# Patient Record
Sex: MALE | Race: WHITE | Employment: FULL TIME | URBAN - METROPOLITAN AREA
[De-identification: names, ages, dates, MRNs, and addresses within clinical notes are randomized per-mention and may not be internally consistent; named-entity substitution may affect disease eponyms.]

---

## 2017-08-28 ENCOUNTER — HOSPITAL ENCOUNTER (EMERGENCY)
Facility: CLINIC | Age: 32
Discharge: HOME | End: 2017-08-29
Attending: EMERGENCY MEDICINE | Admitting: EMERGENCY MEDICINE
Payer: COMMERCIAL

## 2017-08-28 ENCOUNTER — APPOINTMENT (OUTPATIENT)
Dept: GENERAL RADIOLOGY | Facility: CLINIC | Age: 32
End: 2017-08-28
Attending: EMERGENCY MEDICINE
Payer: COMMERCIAL

## 2017-08-28 DIAGNOSIS — R06.02 SHORTNESS OF BREATH: ICD-10-CM

## 2017-08-28 DIAGNOSIS — R07.89 CHEST TIGHTNESS: ICD-10-CM

## 2017-08-28 LAB
ANION GAP SERPL CALCULATED.3IONS-SCNC: 8 MMOL/L (ref 3–14)
BASOPHILS # BLD AUTO: 0 10E9/L (ref 0–0.2)
BASOPHILS NFR BLD AUTO: 0.2 %
BUN SERPL-MCNC: 16 MG/DL (ref 7–30)
CALCIUM SERPL-MCNC: 8.2 MG/DL (ref 8.5–10.1)
CHLORIDE SERPL-SCNC: 106 MMOL/L (ref 94–109)
CO2 SERPL-SCNC: 27 MMOL/L (ref 20–32)
CREAT SERPL-MCNC: 1.29 MG/DL (ref 0.66–1.25)
D DIMER PPP FEU-MCNC: <0.3 UG/ML FEU (ref 0–0.5)
DIFFERENTIAL METHOD BLD: NORMAL
EOSINOPHIL # BLD AUTO: 0.3 10E9/L (ref 0–0.7)
EOSINOPHIL NFR BLD AUTO: 4.3 %
ERYTHROCYTE [DISTWIDTH] IN BLOOD BY AUTOMATED COUNT: 12.8 % (ref 10–15)
GFR SERPL CREATININE-BSD FRML MDRD: 64 ML/MIN/1.7M2
GLUCOSE SERPL-MCNC: 125 MG/DL (ref 70–99)
HCT VFR BLD AUTO: 41.2 % (ref 40–53)
HGB BLD-MCNC: 14.4 G/DL (ref 13.3–17.7)
IMM GRANULOCYTES # BLD: 0 10E9/L (ref 0–0.4)
IMM GRANULOCYTES NFR BLD: 0.2 %
LYMPHOCYTES # BLD AUTO: 2.2 10E9/L (ref 0.8–5.3)
LYMPHOCYTES NFR BLD AUTO: 37.4 %
MCH RBC QN AUTO: 31 PG (ref 26.5–33)
MCHC RBC AUTO-ENTMCNC: 35 G/DL (ref 31.5–36.5)
MCV RBC AUTO: 89 FL (ref 78–100)
MONOCYTES # BLD AUTO: 0.3 10E9/L (ref 0–1.3)
MONOCYTES NFR BLD AUTO: 5.2 %
NEUTROPHILS # BLD AUTO: 3.2 10E9/L (ref 1.6–8.3)
NEUTROPHILS NFR BLD AUTO: 52.7 %
NRBC # BLD AUTO: 0 10*3/UL
NRBC BLD AUTO-RTO: 0 /100
PLATELET # BLD AUTO: 182 10E9/L (ref 150–450)
POTASSIUM SERPL-SCNC: 3.4 MMOL/L (ref 3.4–5.3)
RBC # BLD AUTO: 4.65 10E12/L (ref 4.4–5.9)
SODIUM SERPL-SCNC: 142 MMOL/L (ref 133–144)
TROPONIN I SERPL-MCNC: <0.015 UG/L (ref 0–0.04)
WBC # BLD AUTO: 6 10E9/L (ref 4–11)

## 2017-08-28 PROCEDURE — 71020 XR CHEST 2 VW: CPT

## 2017-08-28 PROCEDURE — 25000125 ZZHC RX 250: Performed by: EMERGENCY MEDICINE

## 2017-08-28 PROCEDURE — 84484 ASSAY OF TROPONIN QUANT: CPT | Performed by: EMERGENCY MEDICINE

## 2017-08-28 PROCEDURE — 85025 COMPLETE CBC W/AUTO DIFF WBC: CPT | Performed by: EMERGENCY MEDICINE

## 2017-08-28 PROCEDURE — 93005 ELECTROCARDIOGRAM TRACING: CPT | Performed by: EMERGENCY MEDICINE

## 2017-08-28 PROCEDURE — 99284 EMERGENCY DEPT VISIT MOD MDM: CPT | Mod: 25 | Performed by: EMERGENCY MEDICINE

## 2017-08-28 PROCEDURE — 80076 HEPATIC FUNCTION PANEL: CPT | Performed by: EMERGENCY MEDICINE

## 2017-08-28 PROCEDURE — 94640 AIRWAY INHALATION TREATMENT: CPT | Performed by: EMERGENCY MEDICINE

## 2017-08-28 PROCEDURE — 93010 ELECTROCARDIOGRAM REPORT: CPT | Mod: Z6 | Performed by: EMERGENCY MEDICINE

## 2017-08-28 PROCEDURE — 85379 FIBRIN DEGRADATION QUANT: CPT | Performed by: EMERGENCY MEDICINE

## 2017-08-28 PROCEDURE — 99285 EMERGENCY DEPT VISIT HI MDM: CPT | Mod: 25 | Performed by: EMERGENCY MEDICINE

## 2017-08-28 PROCEDURE — 80048 BASIC METABOLIC PNL TOTAL CA: CPT | Performed by: EMERGENCY MEDICINE

## 2017-08-28 RX ORDER — IPRATROPIUM BROMIDE AND ALBUTEROL SULFATE 2.5; .5 MG/3ML; MG/3ML
3 SOLUTION RESPIRATORY (INHALATION) ONCE
Status: COMPLETED | OUTPATIENT
Start: 2017-08-28 | End: 2017-08-28

## 2017-08-28 RX ORDER — POTASSIUM CHLORIDE 1.5 G/1.58G
20 POWDER, FOR SOLUTION ORAL ONCE
Status: DISCONTINUED | OUTPATIENT
Start: 2017-08-28 | End: 2017-08-28

## 2017-08-28 RX ADMIN — IPRATROPIUM BROMIDE AND ALBUTEROL SULFATE 3 ML: .5; 3 SOLUTION RESPIRATORY (INHALATION) at 21:59

## 2017-08-28 ASSESSMENT — ENCOUNTER SYMPTOMS
DYSURIA: 0
DIZZINESS: 1
NUMBNESS: 0
NAUSEA: 1
CHILLS: 0
BACK PAIN: 0
COLOR CHANGE: 0
BRUISES/BLEEDS EASILY: 0
LIGHT-HEADEDNESS: 0
DIARRHEA: 0
NECK PAIN: 0
NERVOUS/ANXIOUS: 0
TROUBLE SWALLOWING: 0
EYE PAIN: 0
HEADACHES: 0
VOMITING: 0
BLOOD IN STOOL: 0
DYSPHORIC MOOD: 0
PALPITATIONS: 0
DIAPHORESIS: 0
FREQUENCY: 0
HEMATURIA: 0
VOICE CHANGE: 0
CONSTIPATION: 0
POLYDIPSIA: 0
ADENOPATHY: 0
SORE THROAT: 0
SHORTNESS OF BREATH: 1
CHEST TIGHTNESS: 1
WEAKNESS: 0
COUGH: 1
FEVER: 0
ABDOMINAL PAIN: 0

## 2017-08-28 NOTE — ED AVS SNAPSHOT
[facility], Emergency Department    [address] [facility] [address]    Phone:  [phone]                                       Arnold Castle   MRN: [id number]    Department:  [facility], Emergency Department   Date of Visit:  8/28/2017           Patient Information     Date Of Birth          1985        Your diagnoses for this visit were:     Chest tightness        You were seen by Susie Enamorado MD.        Discharge Instructions       TODAY'S VISIT:  You were seen today for shortness of breath and chest tightness.   - Your testing today so far was generally within normal limits, with the exception of your kidney function that was slightly elevated.   - Because we do not yet know the cause of her symptoms    FOLLOW-UP:  Please make an appointment to follow up with:  - Cardiology Clinic (phone: [phone])   -  Your Primary Care Provider, [facility] (phone: [phone]) or [facility] (phone: [phone])     PRESCRIPTIONS / MEDICATIONS:  - Try to avoid ibuprofen/NSAID medications here in the short term given your kidney test results today. You can discuss this with the outpatient Cardiology or your Primary Care teams.     OTHER INSTRUCTIONS:  - Do your best to stay hydrated.     RETURN TO THE EMERGENCY DEPARTMENT  Return to the Emergency Department at any time for new/worsening symptoms.        *CHEST PAIN, UNCERTAIN CAUSE    Based on your exam today, the exact cause of your chest pain is not certain. Your condition does not seem serious at this time, and your pain does not appear to be coming from your heart. However, sometimes the signs of a serious problem take more time to appear. Therefore, watch for the warning signs listed below.  HOME CARE:  1. Rest today and avoid strenuous activity.  2. Take any prescribed medicine as directed.  FOLLOW UP with your doctor in 1-3 days.   GET PROMPT MEDICAL ATTENTION if any of the following occur:    A change in  the type of pain: if it feels different, becomes more severe, lasts longer, or begins to spread into your shoulder, arm, neck, jaw or back    Shortness of breath or increased pain with breathing    Weakness, dizziness, or fainting    Cough with blood or dark colored sputum (phlegm)    Fever over 101  F (38.3  C)    Swelling, pain or redness in one leg    [id number] Nikky Smith, [address] [place] [address], [place] [address]. All rights reserved. This information is not intended as a substitute for professional medical care. Always follow your healthcare professional's instructions.      Shortness of Breath (Dyspnea)  Shortness of breath is the feeling that you can't catch your breath or get enough air. It is also known as dyspnea.  Dyspnea can be caused by many different conditions. They include:    Acute asthma attack.    Worsening of chronic lung diseases such as chronic bronchitis and emphysema.    Heart failure. This is when weak heart muscle allows extra fluid to collect in the lungs.    Panic attacks or anxiety. Fear can cause rapid breathing (hyperventilation).    Pneumonia, or an infection in the lung tissue.    Exposure to toxic substances, fumes, smoke, or certain medicines.    Blood clot in the lung (pulmonary embolism). This is often from a piece of blood clot in a deep vein of the leg (deep vein thrombosis) that breaks off and travels to the lungs.    Heart attack or heart-related chest pain (angina).    Anemia.    Collapsed lung (pneumothorax).    Dehydration.    Pregnancy.  Based on your visit today, the exact cause of your shortness of breath is not certain. Your tests don t show any of the serious causes of dyspnea. You may need other tests to find out if you have a serious problem. It s important to watch for any new symptoms or symptoms that get worse. Follow up with your healthcare provider as directed.  Home care  Follow these tips to take care of yourself at home:    When your symptoms are  better, go back to your usual activities.    If you smoke, you should stop. Join a quit-smoking program or ask your healthcare provider for help.    Eat a healthy diet and get plenty of sleep.    Get regular exercise. Talk with your healthcare provider before starting to exercise, especially if you have other medical problems.    Cut down on the amount of caffeine and stimulants you consume.  Follow-up care  Follow up with your healthcare provider, or as advised.  If tests were done, you will be told if your treatment needs to be changed. You can call as directed for the results.  (Note: If an X-ray was taken, a specialist will review it. You will be notified of any new findings that may affect your care.)  Call 911 or get immediate medical care  Shortness of breath may be a sign of a serious medical problem. For example, it may be a problem with your heart or lungs. Call 911 if you have worsening shortness of breath or trouble breathing, especially with any of the symptoms below:    You are confused or it s difficult to wake you.    You faint or lose consciousness.    You have a fast heartbeat, or your heartbeat is irregular.    You are coughing up blood.    You have pain in your chest, arm, shoulder, neck, or upper back.    You break out in a sweat.  When to seek medical advice  Call your healthcare provider right away if any of these occur:    Slight shortness of breath or wheezing    Redness, pain or swelling in your leg, arm, or other body area    Swelling in both legs or ankles    Fast weight gain    Dizziness or weakness    Fever of 100.4 F (38 C) or higher, or as directed by your healthcare provider  Date Last Reviewed: 9/13/2015 2000-2017 [employer]. [address] [place] [address]. All rights reserved. This information is not intended as a substitute for professional medical care. Always follow your healthcare professional's instructions.          24 Hour Appointment Hotline        To make an appointment at any [facility], call [phone] ([phone]). If you don't have a family doctor or clinic, we will help you find one. [facility] are conveniently located to serve the needs of you and your family.          ED Discharge Orders     CARDIOLOGY EVAL ADULT REFERRAL       Your provider has referred you to:  [facility]: [facility] [phone]   [email or web address]    Please be aware that coverage of these services is subject to the terms and limitations of your health insurance plan.  Call member services at your health plan with any benefit or coverage questions.      Type of Referral:  New Cardiology Consult    Timeframe requested:  1-3 Days    Please bring the following to your appointment:  >>   Any x-rays, CTs or MRIs which have been performed.  Contact the facility where they were done to arrange for  prior to your scheduled appointment.    >>   List of current medications  >>   This referral request   >>   Any documents/labs given to you for this referral                     Review of your medicines      Our records show that you are taking the medicines listed below. If these are incorrect, please call your family doctor or clinic.        Dose / Directions Last dose taken    HYDROcodone-acetaminophen 5-325 MG per tablet   Commonly known as:  NORCO   Dose:  1-2 tablet   Quantity:  30 tablet        Take 1-2 tablets by mouth every 6 hours as needed for pain.   Refills:  0        NO ACTIVE MEDICATIONS        Refills:  0                Procedures and tests performed during your visit     Basic metabolic panel    CBC with platelets differential    D dimer quantitative    EKG 12-lead, tracing only    Hepatic panel    Magnesium    Phosphorus    Troponin I    XR Chest 2 Views      Orders Needing Specimen Collection     None      Pending Results     Date and Time Order  "Name Status Description    2017 2249 XR Chest 2 Views Preliminary     2017 Phosphorus In process     2017 Magnesium In process     2017 EKG 12-lead, tracing only Preliminary             Pending Culture Results     No orders found for last 3 day(s).            Pending Results Instructions     If you had any lab results that were not finalized at the time of your Discharge, you can call the ED Lab Result RN at [phone]. You will be contacted by this team for any positive Lab results or changes in treatment. The nurses are available 7 days a week from 10A to 6:30P.  You can leave a message 24 hours per day and they will return your call.        Thank you for choosing [facility]       Thank you for choosing [facility] for your care. Our goal is always to provide you with excellent care. Hearing back from our patients is one way we can continue to improve our services. Please take a few minutes to complete the written survey that you may receive in the mail after you visit with us. Thank you!        [employer] Information     [employer] lets you send messages to your doctor, view your test results, renew your prescriptions, schedule appointments and more. To sign up, go to [email or web address] . Click on \"Log in\" on the left side of the screen, which will take you to the Welcome page. Then click on \"Sign up Now\" on the right side of the page.     You will be asked to enter the access code listed below, as well as some personal information. Please follow the directions to create your username and password.     Your access code is: [email or web address]  Expires: 2017  1:08 AM     Your access code will  in 90 days. If you need help or a new code, please call your [facility] clinic or [phone].        Care EveryWhere ID     This is your Care EveryWhere ID. This could be used by other organizations to access your [facility] medical records  [id number]        Equal Access to Services     " ALIZA GOMEZ : Hadii ivana Ackerman, wamariamda luqadaha, qaybta kaalmatyrell frankel, ana maria gallegos. So [facility] [phone].    ATENCIÓN: Si habla español, tiene a castañeda disposición servicios gratuitos de asistencia lingüística. Llame al [phone].    We comply with applicable federal civil rights laws and [place] laws. We do not discriminate on the basis of race, color, national origin, age, disability sex, sexual orientation or gender identity.            After Visit Summary       This is your record. Keep this with you and show to your community pharmacist(s) and doctor(s) at your next visit.

## 2017-08-28 NOTE — ED AVS SNAPSHOT
[facility], [place], Emergency Department    [address] [place] [address]    Phone:  [phone]                                       Arnold Castle   MRN: [id number]    Department:  [facility], Emergency Department   Date of Visit:  8/28/2017           After Visit Summary Signature Page     I have received my discharge instructions, and my questions have been answered. I have discussed any challenges I see with this plan with the nurse or doctor.    ..........................................................................................................................................  Patient/Patient Representative Signature      ..........................................................................................................................................  Patient Representative Print Name and Relationship to Patient    ..................................................               ................................................  Date                                            Time    ..........................................................................................................................................  Reviewed by Signature/Title    ...................................................              ..............................................  Date                                                            Time

## 2017-08-29 VITALS
OXYGEN SATURATION: 97 % | TEMPERATURE: 97.6 F | HEIGHT: 71 IN | SYSTOLIC BLOOD PRESSURE: 117 MMHG | RESPIRATION RATE: 19 BRPM | DIASTOLIC BLOOD PRESSURE: 81 MMHG | HEART RATE: 67 BPM

## 2017-08-29 LAB
ALBUMIN SERPL-MCNC: 3.9 G/DL (ref 3.4–5)
ALP SERPL-CCNC: 46 U/L (ref 40–150)
ALT SERPL W P-5'-P-CCNC: 36 U/L (ref 0–70)
AST SERPL W P-5'-P-CCNC: 21 U/L (ref 0–45)
BILIRUB DIRECT SERPL-MCNC: 0.3 MG/DL (ref 0–0.2)
BILIRUB SERPL-MCNC: 1.6 MG/DL (ref 0.2–1.3)
INTERPRETATION ECG - MUSE: NORMAL
PROT SERPL-MCNC: 6.7 G/DL (ref 6.8–8.8)

## 2017-08-29 RX ORDER — ALBUTEROL SULFATE 90 UG/1
2 AEROSOL, METERED RESPIRATORY (INHALATION) EVERY 6 HOURS PRN
Qty: 1 INHALER | Refills: 0 | Status: SHIPPED | OUTPATIENT
Start: 2017-08-29

## 2017-08-29 NOTE — ED PROVIDER NOTES
History     Chief Complaint   Patient presents with     Shortness of Breath     Pleurisy     Dizziness     HPI  Arnold Castle is a 32 year old male who presents for evaluation of chest tightness and shortness of breath. Patient complains of intermittent episodes of shortness of breath and chest tightness with tenderness to palpation. He also complains of dizziness described as lightheadedness as though he may pass out. Additionally, he noted some nausea as well as a mild cough and cold sweats associated with these episodes. He denies nasal congestion, abdominal pain, or vomiting. He notes he did recently return from his honeymoon in [place], and notes his symptoms seemed to start the morning after he arrived in [place]. He reports he has had two episodes with similar symptoms prior to his current symptoms. Each episode was intermittent over about one week, though acutely more severe and constant for about one day. Both of these episodes have occurred since March of this year. No changes in bowel or bladder movements.No rash or skin changes. No known recent tick bites.     Patient does work as a  and is around fumes at work, though no chemicals that are particularly reactive, however, some that have been found to cause respiratory irritation. He has also been more active recently as he has been working on a DIY project. He does have a history of hypoglycemia. No history of DVT/PE. No known family history of MI prior to the age of 50. No personal history of asthma, though does note a family history of his mother having asthma. No known cancer. No recent surgeries.     No other symptoms or complaints at this time. Please see ROS for further details.    I have reviewed the Medications, Allergies, Past Medical and Surgical History, and Social History in the [facility] system.  Past Medical History:   Diagnosis Date     Prostatitis 12/2010     Right hydrocele        Past Surgical History:   Procedure Laterality Date      EYE SURGERY      as child     HYDROCELECTOMY INGUINAL  4/22/2011    Procedure:HYDROCELECTOMY INGUINAL; Choice anesthesia; Surgeon:RIC MAURICIO; Location:[facility] OR     None         Family History   Problem Relation Age of Onset     Genitourinary Problems Mother      urolithiasis     Genitourinary Problems Maternal Grandfather      urolithiaisis     HEART DISEASE Maternal Grandfather      CVA       Social History   Substance Use Topics     Smoking status: Never Smoker     Smokeless tobacco: Never Used     Alcohol use 2.5 oz/week     5 Standard drinks or equivalent per week      Comment: states less than 5 drinks/week       No current facility-administered medications for this encounter.      Current Outpatient Prescriptions   Medication     hydrocodone-acetaminophen 5-325 MG per tablet     NO ACTIVE MEDICATIONS      No Known Allergies    Review of Systems   Constitutional: Negative for chills, diaphoresis and fever.   HENT: Negative for ear pain, sore throat, tinnitus, trouble swallowing and voice change.    Eyes: Negative for pain and visual disturbance.   Respiratory: Positive for cough (mild), chest tightness and shortness of breath.    Cardiovascular: Negative for chest pain, palpitations and leg swelling.   Gastrointestinal: Positive for nausea. Negative for abdominal pain, blood in stool, constipation, diarrhea and vomiting.   Endocrine: Negative for polydipsia and polyuria.   Genitourinary: Negative for dysuria, frequency, hematuria and urgency.   Musculoskeletal: Negative for back pain and neck pain.   Skin: Negative for color change and rash.   Allergic/Immunologic: Negative for immunocompromised state.   Neurological: Positive for dizziness. Negative for weakness, light-headedness, numbness and headaches.   Hematological: Negative for adenopathy. Does not bruise/bleed easily.   Psychiatric/Behavioral: Negative for dysphoric mood. The patient is not nervous/anxious.        Physical Exam   BP:  "147/89  Heart Rate: 82  Temp: 97.6  F (36.4  C)  Resp: 16  Height: 179.1 cm (5' 10.5\")  SpO2: 97 %  Physical Exam  CONSTITUTIONAL: Well-developed and well-nourished. Awake and alert. Non-toxic appearance. No acute distress.   HENT:   - Head: Normocephalic and atraumatic.   - Ears: Hearing and external ear grossly normal.   - Nose: Nose normal. No rhinorrhea. No epistaxis.   - Mouth/Throat: Oropharynx is clear and MMM  EYES: Conjunctivae and lids are normal. No scleral icterus.   NECK: Normal range of motion and phonation normal. Neck supple.  No tracheal deviation, no stridor. No edema or erythema noted.  CARDIOVASCULAR: Normal rate, regular rhythm and no appreciable abnormal heart sounds.  PULMONARY/CHEST: Effort normal. No accessory muscle usage or stridor. No respiratory distress.  No appreciable abnormal breath sounds.  ABDOMEN: Soft, non-distended. No tenderness. No rigidity, rebound or guarding.   MUSCULOSKELETAL: Extremities warm and seemingly well perfused. No edema or calf tenderness.  NEUROLOGIC: Awake, alert. Not disoriented. He displays no atrophy and no tremor. Normal tone. No seizure activity. Coordination normal. GCS 15. No apparent focal deficits.   SKIN: Skin is warm and dry. No rash noted. No diaphoresis. No pallor.   PSYCHIATRIC: Normal mood and affect. Speech and behavior normal. Thought processes linear. Cognition and memory are normal.     ED Course     ED Course   Value Comment By Time   D-Dimer: <0.3 (Reviewed) Susie Enamorado MD 08/28 2204   Troponin I ES: <0.015 (Reviewed) Susie Enamorado MD 08/28 2204   Creatinine: (!) 1.29 (Reviewed) Susie Enamorado MD 08/28 2204     Procedures       9:11 PM  The patient was seen and examined by Dr. Enamorado in Room 9.          EKG Interpretation:      Interpreted by Susie Enamorado MD  Time reviewed: 8:29 PM  Symptoms at time of EKG: shortness of breath   Rhythm: normal sinus   Rate: 63  Axis: normal  Ectopy: none  Conduction: normal  ST Segments/ T " Waves: No acute appearing ST-T wave changes  Q Waves: none  Comparison to prior: none previous  Clinical Impression: normal EKG    Assessments & Plan (with Medical Decision Making)   IMPRESSION: 32-year-old male, generally healthy male, who is presenting this evening with chest tightness and discomfort, shortness of breath, lightheadedness and nausea, having had some intermittent symptoms somewhat similar to this in March as well as May of this year, as described for the above in HPI/ROS.  Quickly, patient appears nontoxic, vitals WNL with the exception of some elevated BP initially on arrival but not in the realm of hypertensive urgency/emergency.  There are no obvious cardiopulmonary findings on exam, abdominal exam is benign.    DDX includes but not limited to pleurisy, MSK-related, pericarditis, reactive airway disease , gastritis/PUD, anxiety, et al. Sounds unlikely for ACS, PE, dissection, marie encarnacion or boerhaave syndrome     PLAN: ECG, labs, CXR, symptom managmeent    RESULTS:  - Labs: Negative d-dimer, negative troponin, creatinine 1.29  --- See ED Course section above for particular pertinent findings and comments  - Imaging: Images and written preliminary reports reviewed by myself and revealed:  --- CXR: No acute findings    INTERVENTIONS:   - DuoNeb    RE-EVALUATION:  See ED Course section above for particular pertinent findings and comments  - The patient's symptoms were improved somewhat with the duoneb.     DISCUSSIONS:  - w/ Patient: I have reviewed the available findings, plan, need for close follow up (Cardiology, PCP for sx's, Cr, etc.), and strict return instructions with the patient and his wife. Will also have decrease exertion level, avoid dust/fumes, etc. They expressed understanding and agreement with this plan. All questions answered to the best of our ability at this time.     DISPOSITION/PLANNING:  - FINAL IMPRESSION: Chest tightness, shortness of breath, elevated creatinine for age  -  DISPOSITION: DC to home  --- Follow-up: With Cardiology as soon as can be arranged  --- Recommendations: Conservative symptom management, strict return instructions      ______________________________________________________________________________    - I have reviewed the available nursing notes.    New Prescriptions    No medications on file       Final diagnoses:   None   IKandice, am serving as a trained medical scribe to document services personally performed by Susie Enamorado MD, based on the provider's statements to me.   ISusie MD, was physically present and have reviewed and verified the accuracy of this note documented by Kandice Kumar.      8/28/2017   [facility], [place], EMERGENCY DEPARTMENT     Susie Enamorado MD  08/30/17 [id number]

## 2017-08-29 NOTE — DISCHARGE INSTRUCTIONS
TODAY'S VISIT:  You were seen today for shortness of breath and chest tightness.   - Your testing today so far was generally within normal limits, with the exception of your kidney function that was slightly elevated.   - Because we do not yet know the cause of her symptoms    FOLLOW-UP:  Please make an appointment to follow up with:  - Cardiology Clinic (phone: [phone])   -  Your Primary Care Provider, [facility] (phone: [phone]) or [facility] (phone: [phone])     PRESCRIPTIONS / MEDICATIONS:  - Try to avoid ibuprofen/NSAID medications here in the short term given your kidney test results today. You can discuss this with the outpatient Cardiology or your Primary Care teams.     OTHER INSTRUCTIONS:  - Do your best to stay hydrated.     RETURN TO THE EMERGENCY DEPARTMENT  Return to the Emergency Department at any time for new/worsening symptoms.        *CHEST PAIN, UNCERTAIN CAUSE    Based on your exam today, the exact cause of your chest pain is not certain. Your condition does not seem serious at this time, and your pain does not appear to be coming from your heart. However, sometimes the signs of a serious problem take more time to appear. Therefore, watch for the warning signs listed below.  HOME CARE:  1. Rest today and avoid strenuous activity.  2. Take any prescribed medicine as directed.  FOLLOW UP with your doctor in 1-3 days.   GET PROMPT MEDICAL ATTENTION if any of the following occur:    A change in the type of pain: if it feels different, becomes more severe, lasts longer, or begins to spread into your shoulder, arm, neck, jaw or back    Shortness of breath or increased pain with breathing    Weakness, dizziness, or fainting    Cough with blood or dark colored sputum (phlegm)    Fever over 101  F (38.3  C)    Swelling, pain or redness in one leg    1321-0028 Nikky Smith, [address] [place] [address], [place] [address]. All rights reserved. This information is  not intended as a substitute for professional medical care. Always follow your healthcare professional's instructions.      Shortness of Breath (Dyspnea)  Shortness of breath is the feeling that you can't catch your breath or get enough air. It is also known as dyspnea.  Dyspnea can be caused by many different conditions. They include:    Acute asthma attack.    Worsening of chronic lung diseases such as chronic bronchitis and emphysema.    Heart failure. This is when weak heart muscle allows extra fluid to collect in the lungs.    Panic attacks or anxiety. Fear can cause rapid breathing (hyperventilation).    Pneumonia, or an infection in the lung tissue.    Exposure to toxic substances, fumes, smoke, or certain medicines.    Blood clot in the lung (pulmonary embolism). This is often from a piece of blood clot in a deep vein of the leg (deep vein thrombosis) that breaks off and travels to the lungs.    Heart attack or heart-related chest pain (angina).    Anemia.    Collapsed lung (pneumothorax).    Dehydration.    Pregnancy.  Based on your visit today, the exact cause of your shortness of breath is not certain. Your tests don t show any of the serious causes of dyspnea. You may need other tests to find out if you have a serious problem. It s important to watch for any new symptoms or symptoms that get worse. Follow up with your healthcare provider as directed.  Home care  Follow these tips to take care of yourself at home:    When your symptoms are better, go back to your usual activities.    If you smoke, you should stop. Join a quit-smoking program or ask your healthcare provider for help.    Eat a healthy diet and get plenty of sleep.    Get regular exercise. Talk with your healthcare provider before starting to exercise, especially if you have other medical problems.    Cut down on the amount of caffeine and stimulants you consume.  Follow-up care  Follow up with your healthcare provider, or as advised.  If  tests were done, you will be told if your treatment needs to be changed. You can call as directed for the results.  (Note: If an X-ray was taken, a specialist will review it. You will be notified of any new findings that may affect your care.)  Call 911 or get immediate medical care  Shortness of breath may be a sign of a serious medical problem. For example, it may be a problem with your heart or lungs. Call 911 if you have worsening shortness of breath or trouble breathing, especially with any of the symptoms below:    You are confused or it s difficult to wake you.    You faint or lose consciousness.    You have a fast heartbeat, or your heartbeat is irregular.    You are coughing up blood.    You have pain in your chest, arm, shoulder, neck, or upper back.    You break out in a sweat.  When to seek medical advice  Call your healthcare provider right away if any of these occur:    Slight shortness of breath or wheezing    Redness, pain or swelling in your leg, arm, or other body area    Swelling in both legs or ankles    Fast weight gain    Dizziness or weakness    Fever of 100.4 F (38 C) or higher, or as directed by your healthcare provider  Date Last Reviewed: 9/13/2015 2000-2017 [employer]. [address] [place] [address], [place] [address]. All rights reserved. This information is not intended as a substitute for professional medical care. Always follow your healthcare professional's instructions.

## 2017-08-29 NOTE — ED NOTES
Shortness of breath. Worsened over the last 48 hours. Chest tightness. Dizziness. Patient states he had a similar episodes in March and May and was seen at urgent care in May. Urgent care did an EKG and a chest x-ray which were normal.

## 2017-09-11 ENCOUNTER — PRE VISIT (OUTPATIENT)
Dept: CARDIOLOGY | Facility: CLINIC | Age: 32
End: 2017-09-11

## 2017-09-12 ENCOUNTER — OFFICE VISIT (OUTPATIENT)
Dept: CARDIOLOGY | Facility: CLINIC | Age: 32
End: 2017-09-12
Attending: INTERNAL MEDICINE
Payer: COMMERCIAL

## 2017-09-12 VITALS
OXYGEN SATURATION: 96 % | HEART RATE: 68 BPM | DIASTOLIC BLOOD PRESSURE: 78 MMHG | HEIGHT: 70 IN | BODY MASS INDEX: 29.06 KG/M2 | SYSTOLIC BLOOD PRESSURE: 120 MMHG | WEIGHT: 203 LBS

## 2017-09-12 DIAGNOSIS — I30.0 ACUTE IDIOPATHIC PERICARDITIS: ICD-10-CM

## 2017-09-12 DIAGNOSIS — R07.9 CHEST PAIN, UNSPECIFIED TYPE: Primary | ICD-10-CM

## 2017-09-12 DIAGNOSIS — I31.9 PERICARDITIS: ICD-10-CM

## 2017-09-12 DIAGNOSIS — R07.9 CHEST PAIN: ICD-10-CM

## 2017-09-12 LAB
ALBUMIN SERPL-MCNC: 3.9 G/DL (ref 3.4–5)
ALP SERPL-CCNC: 46 U/L (ref 40–150)
ALT SERPL W P-5'-P-CCNC: 46 U/L (ref 0–70)
ANION GAP SERPL CALCULATED.3IONS-SCNC: 6 MMOL/L (ref 3–14)
AST SERPL W P-5'-P-CCNC: 19 U/L (ref 0–45)
BILIRUB DIRECT SERPL-MCNC: 0.3 MG/DL (ref 0–0.2)
BILIRUB SERPL-MCNC: 1.7 MG/DL (ref 0.2–1.3)
BUN SERPL-MCNC: 13 MG/DL (ref 7–30)
CALCIUM SERPL-MCNC: 8.7 MG/DL (ref 8.5–10.1)
CHLORIDE SERPL-SCNC: 107 MMOL/L (ref 94–109)
CO2 SERPL-SCNC: 27 MMOL/L (ref 20–32)
CREAT SERPL-MCNC: 0.95 MG/DL (ref 0.66–1.25)
ERYTHROCYTE [SEDIMENTATION RATE] IN BLOOD BY WESTERGREN METHOD: 5 MM/H (ref 0–15)
GFR SERPL CREATININE-BSD FRML MDRD: >90 ML/MIN/1.7M2
GLUCOSE SERPL-MCNC: 101 MG/DL (ref 70–99)
POTASSIUM SERPL-SCNC: 4 MMOL/L (ref 3.4–5.3)
PROT SERPL-MCNC: 7.2 G/DL (ref 6.8–8.8)
SODIUM SERPL-SCNC: 139 MMOL/L (ref 133–144)

## 2017-09-12 PROCEDURE — 99212 OFFICE O/P EST SF 10 MIN: CPT | Mod: ZF

## 2017-09-12 PROCEDURE — 85652 RBC SED RATE AUTOMATED: CPT | Performed by: INTERNAL MEDICINE

## 2017-09-12 PROCEDURE — 36415 COLL VENOUS BLD VENIPUNCTURE: CPT | Performed by: INTERNAL MEDICINE

## 2017-09-12 PROCEDURE — 99204 OFFICE O/P NEW MOD 45 MIN: CPT | Mod: ZP | Performed by: INTERNAL MEDICINE

## 2017-09-12 PROCEDURE — 80048 BASIC METABOLIC PNL TOTAL CA: CPT | Performed by: INTERNAL MEDICINE

## 2017-09-12 PROCEDURE — 80076 HEPATIC FUNCTION PANEL: CPT | Performed by: INTERNAL MEDICINE

## 2017-09-12 RX ORDER — CETIRIZINE HYDROCHLORIDE 10 MG/1
10 TABLET ORAL
COMMUNITY

## 2017-09-12 RX ORDER — COLCHICINE 0.6 MG/1
0.6 TABLET ORAL 2 TIMES DAILY
Qty: 180 TABLET | Refills: 3 | Status: SHIPPED | OUTPATIENT
Start: 2017-09-12

## 2017-09-12 RX ORDER — IBUPROFEN 200 MG
200-400 TABLET ORAL
COMMUNITY

## 2017-09-12 ASSESSMENT — PAIN SCALES - GENERAL: PAINLEVEL: MILD PAIN (2)

## 2017-09-12 NOTE — PROGRESS NOTES
SUBJECTIVE:  Arnold Castle is a 32 year old male who presents for evaluation of chest pain and intermittent SOB/diaphoresis.  Work at [employer].  Had flu like symptoms in March.For past 2-3 weeks having chest pain.Initially sharp pain,but now constant. Increased by deep breathing,lying flat. Started on Ibuprofen 800TID.Better now. At times have SOB and diaphoresis.  Non smoker. No other co-morbidities.  No significant cardiac risk factors. F/H of DM2.  Plays soccer,but avoids since onset of symptoms.  Patient Active Problem List    Diagnosis Date Noted     Right hydrocele      Priority: Medium     Prostatitis 12/01/2010     Priority: Medium    .  Current Outpatient Prescriptions   Medication Sig     cetirizine (ZYRTEC) 10 MG tablet Take 10 mg by mouth     ibuprofen (ADVIL/MOTRIN) 200 MG tablet Take 200-400 mg by mouth     colchicine 0.6 MG tablet Take 1 tablet (0.6 mg) by mouth 2 times daily     albuterol (PROAIR HFA/PROVENTIL HFA/VENTOLIN HFA) 108 (90 BASE) MCG/ACT Inhaler Inhale 2 puffs into the lungs every 6 hours as needed for shortness of breath / dyspnea or wheezing     hydrocodone-acetaminophen 5-325 MG per tablet Take 1-2 tablets by mouth every 6 hours as needed for pain.     No current facility-administered medications for this visit.      Past Medical History:   Diagnosis Date     Prostatitis 12/2010     Right hydrocele      Past Surgical History:   Procedure Laterality Date     EYE SURGERY      as child     HYDROCELECTOMY INGUINAL  4/22/2011    Procedure:HYDROCELECTOMY INGUINAL; Choice anesthesia; Surgeon:RIC MAURICIO; Location:[facility] OR     None       No Known Allergies  Social History     Social History     Marital status:      Spouse name: N/A     Number of children: N/A     Years of education: N/A     Occupational History     Not on file.     Social History Main Topics     Smoking status: Never Smoker     Smokeless tobacco: Never Used     Alcohol use 2.5 oz/week     5 Standard drinks or  "equivalent per week      Comment: states less than 5 drinks/week     Drug use: No     Sexual activity: Not on file     Other Topics Concern     Not on file     Social History Narrative     Family History   Problem Relation Age of Onset     Genitourinary Problems Mother      urolithiasis     Genitourinary Problems Maternal Grandfather      urolithiaisis     HEART DISEASE Maternal Grandfather      CVA          REVIEW OF SYSTEMS:  General: negative, fever, chills, night sweats  Skin: negative, acne, rash and scaling  Eyes: negative, glaucoma, eye pain and photophobia  Ears/Nose/Throat: negative, nasal congestion and purulent rhinorrhea  Respiratory: No dyspnea on exertion, No cough, No hemoptysis and negative  Cardiovascular: negative, palpitations, tachycardia, irregular heart beat, exertional chest pain or pressure, paroxysmal nocturnal dyspnea, dyspnea on exertion and orthopnea  Gastrointestinal: negative, dysphagia, nausea and vomiting  Genitourinary: negative, nocturia, dysuria and frequency  Musculoskeletal: negative, fracture, back pain, neck pain and arthritis  Neurologic: negative, headaches, syncope, stroke, seizures and paralysis  Psychiatric: negative, nervous breakdown, thoughts of self-harm and thoughts of hurting someone else  Hematologic/Lymphatic/Immunologic: negative, bleeding disorder, chills and fever  Endocrine: negative, cold intolerance, heat intolerance and hot flashes       OBJECTIVE:  Blood pressure 120/78, pulse 68, height 1.778 m (5' 10\"), weight 92.1 kg (203 lb), SpO2 96 %.  General Appearance: healthy, alert, active and no distress  Head: Normocephalic. No masses, lesions, tenderness or abnormalities  Eyes: conjuctiva clear, PERRL, EOM intact  Ears: External ears normal. Canals clear. TM's normal.  Nose: Nares normal  Mouth: normal  Neck: Supple, no cervical adenopathy, no thyromegaly  Lungs: clear to auscultation  Cardiac: regular rate and rhythm, normal S1 and S2, no murmur.Nop " rub.  Abdomen: Soft, nontender.  Normal bowel sounds.  No hepatosplenomegaly or abnormal masses  Extremities: no peripheral edema, peripheral pulses normal  Musculoskeletal: negative  Neurological: Cranial nerves 2-12 intact, motor strength intact       ASSESSMENT/PLAN:  Young male with pleuritic type of chest pain No other significant past medical history or family history. Co-morbidities. Feels better on Ibuprofen.  Reviewed EKG. Normal.  Overall sounds like pericarditis.  Will plan for an echo.  Also will check BMP/LFTs/CRP/ESR.  Will start colchicine 0.6mg BID.  His Creatinine is up from baseline. If elevated will change medications.  Also discussed elevated bilirubin. Adv. To discuss with PCP.  Per orders.   Return to Clinic 2months.

## 2017-09-12 NOTE — PATIENT INSTRUCTIONS
Please start taking Colchicine 0.6 MG twice daily.    Thank you for your visit today.  Please call me with any questions or concerns.   Sherwin Almazan RN  Cardiology Care Coordinator  [phone], press option 1 then option 3

## 2017-09-12 NOTE — LETTER
9/12/2017      RE: Arnold Castle  [address] [facility] [id number]       Dear Colleague,    Thank you for the opportunity to participate in the care of your patient, Arnold Castle, at the [facility]. Please see a copy of my visit note below.       SUBJECTIVE:  Arnold Castle is a 32 year old male who presents for evaluation of chest pain and intermittent SOB/diaphoresis.  Work at .  Had flu like symptoms in March.For past 2-3 weeks having chest pain.Initially sharp pain,but now constant. Increased by deep breathing,lying flat. Started on Ibuprofen 800TID.Better now. At times have SOB and diaphoresis.  Non smoker. No other co-morbidities.  No significant cardiac risk factors. F/H of DM2.  Plays soccer,but avoids since onset of symptoms.  Patient Active Problem List    Diagnosis Date Noted     Right hydrocele      Priority: Medium     Prostatitis 12/01/2010     Priority: Medium    .  Current Outpatient Prescriptions   Medication Sig     cetirizine (ZYRTEC) 10 MG tablet Take 10 mg by mouth     ibuprofen (ADVIL/MOTRIN) 200 MG tablet Take 200-400 mg by mouth     colchicine 0.6 MG tablet Take 1 tablet (0.6 mg) by mouth 2 times daily     albuterol (PROAIR HFA/PROVENTIL HFA/VENTOLIN HFA) 108 (90 BASE) MCG/ACT Inhaler Inhale 2 puffs into the lungs every 6 hours as needed for shortness of breath / dyspnea or wheezing     hydrocodone-acetaminophen 5-325 MG per tablet Take 1-2 tablets by mouth every 6 hours as needed for pain.     No current facility-administered medications for this visit.      Past Medical History:   Diagnosis Date     Prostatitis 12/2010     Right hydrocele      Past Surgical History:   Procedure Laterality Date     EYE SURGERY      as child     HYDROCELECTOMY INGUINAL  4/22/2011    Procedure:HYDROCELECTOMY INGUINAL; Choice anesthesia; Surgeon:RIC MAURICIO; Location: [facility]     None       No Known Allergies  Social  "History     Social History     Marital status:      Spouse name: N/A     Number of children: N/A     Years of education: N/A     Occupational History     Not on file.     Social History Main Topics     Smoking status: Never Smoker     Smokeless tobacco: Never Used     Alcohol use 2.5 oz/week     5 Standard drinks or equivalent per week      Comment: states less than 5 drinks/week     Drug use: No     Sexual activity: Not on file     Other Topics Concern     Not on file     Social History Narrative     Family History   Problem Relation Age of Onset     Genitourinary Problems Mother      urolithiasis     Genitourinary Problems Maternal Grandfather      urolithiaisis     HEART DISEASE Maternal Grandfather      CVA          REVIEW OF SYSTEMS:  General: negative, fever, chills, night sweats  Skin: negative, acne, rash and scaling  Eyes: negative, glaucoma, eye pain and photophobia  Ears/Nose/Throat: negative, nasal congestion and purulent rhinorrhea  Respiratory: No dyspnea on exertion, No cough, No hemoptysis and negative  Cardiovascular: negative, palpitations, tachycardia, irregular heart beat, exertional chest pain or pressure, paroxysmal nocturnal dyspnea, dyspnea on exertion and orthopnea  Gastrointestinal: negative, dysphagia, nausea and vomiting  Genitourinary: negative, nocturia, dysuria and frequency  Musculoskeletal: negative, fracture, back pain, neck pain and arthritis  Neurologic: negative, headaches, syncope, stroke, seizures and paralysis  Psychiatric: negative, nervous breakdown, thoughts of self-harm and thoughts of hurting someone else  Hematologic/Lymphatic/Immunologic: negative, bleeding disorder, chills and fever  Endocrine: negative, cold intolerance, heat intolerance and hot flashes       OBJECTIVE:  Blood pressure 120/78, pulse 68, height 1.778 m (5' 10\"), weight 92.1 kg (203 lb), SpO2 96 %.  General Appearance: healthy, alert, active and no distress  Head: Normocephalic. No masses, " lesions, tenderness or abnormalities  Eyes: conjuctiva clear, PERRL, EOM intact  Ears: External ears normal. Canals clear. TM's normal.  Nose: Nares normal  Mouth: normal  Neck: Supple, no cervical adenopathy, no thyromegaly  Lungs: clear to auscultation  Cardiac: regular rate and rhythm, normal S1 and S2, no murmur.Nop rub.  Abdomen: Soft, nontender.  Normal bowel sounds.  No hepatosplenomegaly or abnormal masses  Extremities: no peripheral edema, peripheral pulses normal  Musculoskeletal: negative  Neurological: Cranial nerves 2-12 intact, motor strength intact       ASSESSMENT/PLAN:  Young male with pleuritic type of chest pain No other significant past medical history or family history. Co-morbidities. Feels better on Ibuprofen.  Reviewed EKG. Normal.  Overall sounds like pericarditis.  Will plan for an echo.  Also will check BMP/LFTs/CRP/ESR.  Will start colchicine 0.6mg BID.  His Creatinine is up from baseline. If elevated will change medications.  Also discussed elevated bilirubin. Adv. To discuss with PCP.  Per orders.   Return to Clinic 2months.      BILL Russell MD

## 2017-09-12 NOTE — MR AVS SNAPSHOT
After Visit Summary   9/12/2017    Arnold Castle    MRN: [id number]           Patient Information     Date Of Birth          1985        Visit Information        Provider Department      9/12/2017 9:00 AM BILL Russell MD [facility]        Today's Diagnoses     Chest pain    -  1    Pericarditis          Care Instructions    Please start taking Colchicine 0.6 MG twice daily.    Thank you for your visit today.  Please call me with any questions or concerns.   Sherwin Almazan  RN  Cardiology Care Coordinator  [phone], press option 1 then option 3          Follow-ups after your visit        Follow-up notes from your care team     Return in about 2 months (around 11/12/2017) for Pericarditis, Dr. Merritt.      Your next 10 appointments already scheduled     Sep 12, 2017 11:30 AM CDT   Lab with  LAB    [facility] ([facility])    [address] [place] [id number] [phone]            Sep 18, 2017  4:00 PM CDT   Ech Complete with ECHCR2    Health Echo ([facility])    [address] [place] [id number] [phone]           1. Please bring or wear a comfortable two-piece outfit. 2. You may eat, drink and take your normal medicines. 3. For any questions that cannot be answered, please contact the ordering physician            Nov 14, 2017  8:30 AM CST   (Arrive by 8:15 AM)   Return Visit with BILL Russell MD   [facility] ([facility])    [address] [place] [address] [id number] [phone]              Future tests that were ordered for you today     Open Future Orders        Priority Expected Expires Ordered    Echocardiogram Complete Routine  9/12/2018 9/12/2017    Basic metabolic panel Routine  9/12/2018 9/12/2017    Hepatic panel Routine  9/12/2018 9/12/2017    Erythrocyte sedimentation rate auto Routine  9/12/2018  "2017            Who to contact     If you have questions or need follow up information about today's clinic visit or your schedule please contact [facility] directly at [phone].  Normal or non-critical lab and imaging results will be communicated to you by MyChart, letter or phone within 4 business days after the clinic has received the results. If you do not hear from us within 7 days, please contact the clinic through MyChart or phone. If you have a critical or abnormal lab result, we will notify you by phone as soon as possible.  Submit refill requests through [employer] or call your pharmacy and they will forward the refill request to us. Please allow 3 business days for your refill to be completed.          Additional Information About Your Visit        [employer] Information     [employer] lets you send messages to your doctor, view your test results, renew your prescriptions, schedule appointments and more. To sign up, go to [email or web address] . Click on \"Log in\" on the left side of the screen, which will take you to the Welcome page. Then click on \"Sign up Now\" on the right side of the page.     You will be asked to enter the access code listed below, as well as some personal information. Please follow the directions to create your username and password.     Your access code is: [email or web address]  Expires: 2017  1:08 AM     Your access code will  in 90 days. If you need help or a new code, please call your [facility] clinic or [phone].        Care EveryWhere ID     This is your Care EveryWhere ID. This could be used by other organizations to access your [facility] medical records  [id number]        Your Vitals Were     Pulse Height Pulse Oximetry BMI (Body Mass Index)          68 1.778 m (5' 10\") 96% 29.13 kg/m2         Blood Pressure from Last 3 Encounters:   17 120/78   17 117/81   01/06/15 110/76    Weight from Last 3 Encounters:   17 92.1 kg (203 lb)   01/06/15 " 90.3 kg (199 lb)   04/22/11 79.2 kg (174 lb 9.7 oz)              We Performed the Following     EKG 12-lead, tracing only (Same Day)          Today's Medication Changes          These changes are accurate as of: 9/12/17 10:12 AM.  If you have any questions, ask your nurse or doctor.               Start taking these medicines.        Dose/Directions    colchicine 0.6 MG tablet   Used for:  Pericarditis   Started by:  BILL Russell MD        Dose:  0.6 mg   Take 1 tablet (0.6 mg) by mouth 2 times daily   Quantity:  180 tablet   Refills:  3         Stop taking these medicines if you haven't already. Please contact your care team if you have questions.     NO ACTIVE MEDICATIONS   Stopped by:  BILL Russell MD                Where to get your medicines      These medications were sent to [facility]/pharmacy #[id number] - [place], [place] - [address] [address]     Phone:  [phone]     colchicine 0.6 MG tablet                Primary Care Provider    Physician No Ref-Primary       No address on file        Equal Access to Services     [facility]: Hadkelly fayo Sojorge alberto, waaxda luqadaha, qaybta kaalmatyrell frankel, ana maria barrow . So [facility] [phone].    ATENCIÓN: Si habla español, tiene a castañeda disposición servicios gratuitos de asistencia lingüística. Llame al [phone].    We comply with applicable federal civil rights laws and [place] laws. We do not discriminate on the basis of race, color, national origin, age, disability sex, sexual orientation or gender identity.            Thank you!     Thank you for choosing [facility]  for your care. Our goal is always to provide you with excellent care. Hearing back from our patients is one way we can continue to improve our services. Please take a few minutes to complete the written survey that you may receive in the mail after your visit with us. Thank you!             Your Updated Medication List  - Protect others around you: Learn how to safely use, store and throw away your medicines at www.disposemymeds.org.          This list is accurate as of: 9/12/17 10:12 AM.  Always use your most recent med list.                   Brand Name Dispense Instructions for use Diagnosis    albuterol 108 (90 BASE) MCG/ACT Inhaler    PROAIR HFA/PROVENTIL HFA/VENTOLIN HFA    1 Inhaler    Inhale 2 puffs into the lungs every 6 hours as needed for shortness of breath / dyspnea or wheezing        cetirizine 10 MG tablet    zyrTEC     Take 10 mg by mouth        colchicine 0.6 MG tablet     180 tablet    Take 1 tablet (0.6 mg) by mouth 2 times daily    Pericarditis       HYDROcodone-acetaminophen 5-325 MG per tablet    NORCO    30 tablet    Take 1-2 tablets by mouth every 6 hours as needed for pain.    Right hydrocele       ibuprofen 200 MG tablet    ADVIL/MOTRIN     Take 200-400 mg by mouth

## 2017-09-18 ENCOUNTER — RADIANT APPOINTMENT (OUTPATIENT)
Dept: CARDIOLOGY | Facility: CLINIC | Age: 32
End: 2017-09-18

## 2017-09-18 DIAGNOSIS — R07.9 CHEST PAIN, UNSPECIFIED TYPE: ICD-10-CM

## 2017-09-18 DIAGNOSIS — I30.0 ACUTE IDIOPATHIC PERICARDITIS: ICD-10-CM
